# Patient Record
(demographics unavailable — no encounter records)

---

## 2017-03-24 NOTE — US
HISTORY:

pain, DUB



COMPARISON:

Transvaginal pelvic ultrasound performed 9/24/15



TECHNIQUE:

Transabdominal pelvic ultrasound



FINDINGS:



UTERUS:

Measures 10.8 x 3.1 x 5.3 cm. Anteverted.



ENDOMETRIUM:

Measures 4 mm in diameter.  



CERVIX:

No cervical abnormality identified.



RIGHT OVARY:

Measures 3.4 x 1.7 x 2.5 cm. Blood flow is demonstrated.



LEFT OVARY:

Measures 2.5 x 1.7 x 3.5 cm. Blood flow is demonstrated.



FREE FLUID:

No significant free fluid noted.



OTHER FINDINGS:

None. 



IMPRESSION:

Unremarkable pelvic ultrasound.

## 2017-03-24 NOTE — ED PDOC
HPI: Female  Pain


Time Seen by Provider: 03/24/17 13:24


Chief Complaint (Nursing): Female Genitourinary


Chief Complaint (Provider): Female Genitourinary


History Per: Patient


History/Exam Limitations: no limitations


Onset/Duration Of Symptoms: Days


Current Symptoms Are (Timing): Still Present


Severity: Mild


Quality Of Discomfort: Cramping


Associated Symptoms: denies: Fever, Nausea, Vomiting


Alleviating Factors: None


Additional Complaint(s): 


Patient is a 21 year old female with a history of anemia secondary to vaginal 

bleeding resulting in transfusion, presents to ED for 1 month of vaginal 

bleeding with suprapubic cramping. Patient recently had her birth control 

changed 1-2 months ago with no relief, taking Norethradone. Patient notes 

several pads daily. 





Past Medical History


Reviewed: Historical Data, Nursing Documentation, Vital Signs


Vital Signs: 





 Last Vital Signs











Temp  98.1 F   03/24/17 13:03


 


Pulse  78   03/24/17 13:03


 


Resp  18   03/24/17 13:03


 


BP  118/72   03/24/17 13:03


 


Pulse Ox  100   03/24/17 13:03














- Medical History


PMH: Kidney Stones


   Denies: HIV





- Surgical History


Surgical History: No Surg Hx





- Family History


Family History: States: Unknown Family Hx





- Living Arrangements


Living Arrangements: With Family





- Immunization History


Hx Tetanus Toxoid Vaccination: No


Hx Influenza Vaccination: Yes


Hx Pneumococcal Vaccination: No





- Home Medications


Home Medications: 


 Ambulatory Orders











 Medication  Instructions  Recorded


 


Ascorbic Acid [Vitamin C] 500 mg PO DAILY #30 tab 09/25/15


 


Ferrous Sulfate 325 mg PO DAILY #30 tab 09/25/15


 


Norgestimate-Ethinyl Estradiol 1 each PO DAILY #28 tablet 09/19/16





[Sprintec 28 Day Tablet]  


 


Ibuprofen [Motrin Tab] 800 mg PO Q6H PRN #20 tab 01/30/17


 


Ferrous Sulfate 325 mg PO DAILY #30 tablet 03/24/17


 


Ibuprofen [Motrin] 600 mg PO TID PRN #30 tab 03/24/17














- Allergies


Allergies/Adverse Reactions: 


 Allergies











Allergy/AdvReac Type Severity Reaction Status Date / Time


 


No Known Allergies Allergy   Verified 03/24/17 13:01














Review of Systems


ROS Statement: Except As Marked, All Systems Reviewed And Found Negative


Constitutional: Negative for: Fever, Weakness


Eyes: Negative for: Vision Change


Cardiovascular: Negative for: Chest Pain, Palpitations


Respiratory: Negative for: Shortness of Breath


Gastrointestinal: Positive for: Abdominal Pain.  Negative for: Nausea, Vomiting


Genitourinary Female: Positive for: Vaginal Bleeding.  Negative for: Dysuria





Physical Exam





- Reviewed


Nursing Documentation Reviewed: Yes


Vital Signs Reviewed: Yes





- Physical Exam


Appears: Positive for: Non-toxic, No Acute Distress


Skin: Positive for: Normal Color, Warm.  Negative for: Pallor


Eye Exam: Positive for: Normal appearance


Neck: Positive for: Normal, Painless ROM


Cardiovascular/Chest: Positive for: Regular Rate, Rhythm.  Negative for: Murmur

, Tachycardia


Respiratory: Positive for: Normal Breath Sounds.  Negative for: Respiratory 

Distress


Gastrointestinal/Abdominal: Positive for: Tenderness (mild suprapubic ).  

Negative for: Distended, Guarding, Rebound


Pelvic Exam: Positive for: External Exam Normal, Bimanual Exam Normal, No Cerv. 

Motion Tender, Active Bleeding (scant, closed os)


Back: Positive for: Normal Inspection


Extremity: Positive for: Normal ROM, Capillary Refill (normal )


Neurologic/Psych: Positive for: Alert, Oriented, Gait (normal ).  Negative for: 

Motor/Sensory Deficits





- Laboratory Results


Result Diagrams: 


 03/24/17 14:45





 03/24/17 14:45


Urine Pregnancy POC: Negative


Urine dip results: Positive for: Blood.  Negative for: Leukocyte Esterase, 

Nitrate, Ketones, Glucose, Bilirubin, Protein





- ECG


O2 Sat by Pulse Oximetry: 100 (RA)


Pulse Ox Interpretation: Normal





Medical Decision Making


Medical Decision Making: 


Time: 1345





Initial impression: Vaginal bleeding: dysmenorrhea vs DUB r/o Anemia and 

endometrial thickening 





Initial plan:


-- Type and screen


-- CMP


-- Lipase


-- Urine preg


-- Urine dip


-- CBC


-- Toradol


-- U/S








Scribe Attestation:


Documented by Doris Robbins acting as a scribe for Brody Haynes PA-C. MD Scribe Attestation:


All medical record entries made by the Scribe were at my direction and 

personally dictated by me. I have reviewed the chart and agree that the record 

accurately reflects my personal performance of the history, physical exam, 

medical decision making, and the department course for this patient. I have 

also personally directed, reviewed, and agree with the discharge instructions 

and disposition.





Labs with no acute finding. 


9.8 Hgb


US pending





US-FINDINGS:


UTERUS:Measures 10.8 x 3.1 x 5.3 cm. Anteverted.


ENDOMETRIUM:Measures 4 mm in diameter.  


CERVIX:No cervical abnormality identified.


RIGHT OVARY:Measures 3.4 x 1.7 x 2.5 cm. Blood flow is demonstrated.


LEFT OVARY:Measures 2.5 x 1.7 x 3.5 cm. Blood flow is demonstrated.


FREE FLUID:No significant free fluid noted.


OTHER FINDINGS:None. 


IMPRESSION: Unremarkable pelvic ultrasound.








discussed with patient results, follow up discharge information with video 

 anais ChanoNely


included labs 9.8 hgb, us and urine test. it included follow up and may need 

for change in hormone therapy


all questions answered, stable for 





Disposition





- Clinical Impression


Clinical Impression: 


 DUB (dysfunctional uterine bleeding), Anemia





- Patient ED Disposition


Is Patient to be Admitted: No


Counseled Patient/Family Regarding: Studies Performed, Diagnosis, Need For 

Followup





- Disposition


Disposition: Routine/Home


Disposition Time: 17:16


Condition: IMPROVED


Additional Instructions: 


follow up with GYN without fail for further evaluation and treatment


your blood level is 9.8


take iron pills 


return for  severe bleeding greater then 1 pad per hour, severe abdominal pain, 

dizziness, headaches, chest pain, trouble breathing, palpitations or any new 

concerns





Prescriptions: 


Ferrous Sulfate 325 mg PO DAILY #30 tablet


Ibuprofen [Motrin] 600 mg PO TID PRN #30 tab


 PRN Reason: Other


Instructions:  Dysfunctional Uterine Bleeding (ED), Anemia (ED)


Print Language: Occitan

## 2018-03-04 NOTE — ED PDOC
HPI: Female  Pain


Time Seen by Provider: 03/04/18 13:18


Chief Complaint (Nursing): Female Genitourinary


Chief Complaint (Provider): VAGINAL DC


History Per: Patient


History/Exam Limitations: no limitations


Onset/Duration Of Symptoms: Days


Additional Complaint(s): 


Pt. with vaginal dc, white color for 3 days.  Pt. with no pelvic, pain, dyspnea

, chest pain, weakness, headaches.  No dysuria.  Has had similar dc momths ago 

and given some meds that helped it.  No fever. 





Past Medical History


Reviewed: Nursing Documentation, Vital Signs


Vital Signs: 





 Last Vital Signs











Temp  98.4 F   03/04/18 12:49


 


Pulse  78   03/04/18 12:49


 


Resp  19   03/04/18 12:49


 


BP  125/74   03/04/18 12:49


 


Pulse Ox  100   03/04/18 12:49














- Medical History


PMH: Kidney Stones


   Denies: HIV


Other PMH: vaginal dc infection





- Surgical History


Surgical History: No Surg Hx





- Family History


Family History: States: Unknown Family Hx





- Living Arrangements


Living Arrangements: With Family





- Social History


Current smoker - smoking cessation education provided: No


Alcohol: None


Drugs: Denies





- Immunization History


Hx Tetanus Toxoid Vaccination: No


Hx Influenza Vaccination: Yes


Hx Pneumococcal Vaccination: No





- Home Medications


Home Medications: 


 Ambulatory Orders











 Medication  Instructions  Recorded


 


Ascorbic Acid [Vitamin C] 500 mg PO DAILY #30 tab 09/25/15


 


Ferrous Sulfate 325 mg PO DAILY #30 tab 09/25/15


 


Norgestimate-Ethinyl Estradiol 1 each PO DAILY #28 tablet 09/19/16





[Sprintec 28 Day Tablet]  


 


Ibuprofen [Motrin Tab] 800 mg PO Q6H PRN #20 tab 01/30/17


 


Ferrous Sulfate 325 mg PO DAILY #30 tablet 03/24/17


 


Ibuprofen [Motrin] 600 mg PO TID PRN #30 tab 03/24/17


 


Ciprofloxacin HCl [Cipro] 250 mg PO BID #6 tab 03/04/18


 


Doxycycline Hyclate [Doryx] 100 mg PO BID 14 Days  cap 03/04/18


 


Ibuprofen [Motrin] 600 mg PO TID 7 Days  tab 03/04/18


 


Nystatin [Mycostatin Cream] 1 appl TP BID 5 Days  tube 03/04/18














- Allergies


Allergies/Adverse Reactions: 


 Allergies











Allergy/AdvReac Type Severity Reaction Status Date / Time


 


No Known Allergies Allergy   Verified 03/24/17 13:01














Review of Systems


Constitutional: Negative for: Chills, Weakness


Cardiovascular: Negative for: Chest Pain, Edema


Respiratory: Negative for: Shortness of Breath


Gastrointestinal: Negative for: Nausea, Vomiting, Abdominal Pain, Diarrhea


Genitourinary Female: Positive for: Vaginal Discharge


Musculoskeletal: Negative for: Neck Pain, Shoulder Pain, Arm Pain


Skin: Negative for: Rash


Neurological: Negative for: Weakness





Physical Exam





- Reviewed


Nursing Documentation Reviewed: Yes


Vital Signs Reviewed: Yes





- Physical Exam


Appears: Positive for: Non-toxic, No Acute Distress


Cardiovascular/Chest: Positive for: Regular Rate, Rhythm


Respiratory: Positive for: Normal Breath Sounds


Gastrointestinal/Abdominal: Positive for: Normal Exam, Bowel Sounds, Soft.  

Negative for: Tenderness


Pelvic Exam: Positive for: External Exam Normal, No Cerv. Motion Tender, Other (

cervix with white-yellow dc; no odor; no erythema of cervix).  Negative for: 

Speculum Exam Normal, Active Bleeding


Back: Positive for: Normal Inspection.  Negative for: L CVA Tenderness, R CVA 

Tenderness


Extremity: Positive for: Normal ROM.  Negative for: Tenderness, Pedal Edema


Neurologic/Psych: Positive for: Alert





- Laboratory Results


Urine dip results: Positive for: Leukocyte Esterase





- ECG


O2 Sat by Pulse Oximetry: 100


Pulse Ox Interpretation: Normal





- Progress


ED Course And Treament: 





1413:  Stable.  AAOx3.  Pt. with vaginal dc.  Will tx for uti, std possible and 

fungal.  Pt. states vaginal area itching.  





Disposition





- Clinical Impression


Clinical Impression: 


 Urinary tract infection, Vaginal discharge








- Patient ED Disposition


Is Patient to be Admitted: No


Counseled Patient/Family Regarding: Diagnosis, Need For Followup, Rx Given





- Disposition


Referrals: 


[facility] - 03/06/18


Disposition: Routine/Home


Disposition Time: 14:14


Condition: STABLE


Additional Instructions: 


Return if not better in 3 days. 


Prescriptions: 


Ciprofloxacin HCl [Cipro] 250 mg PO BID #6 tab


Doxycycline Hyclate [Doryx] 100 mg PO BID 14 Days  cap


Ibuprofen [Motrin] 600 mg PO TID 7 Days  tab


Nystatin [Mycostatin Cream] 1 appl TP BID 5 Days  tube


Instructions:  Urinary Tract Infection, Adult (DC), Vaginal Discharge in Adults


Print Language: Kittitian

## 2018-03-28 NOTE — ED PDOC
HPI: General Adult


Time Seen by Provider: 03/28/18 05:10


Chief Complaint (Nursing): ENT Problem


Chief Complaint (Provider): right ear pain


History Per: Patient


History/Exam Limitations: no limitations


Onset/Duration Of Symptoms: Mins


Current Symptoms Are (Timing): Still Present


Additional Complaint(s): 





21 y/o female presents for evaluation of right ear pain, onset prior to 

arrival.  Patient states she was sleeping and felt something go in to her ear 

and now feels it moving around.  Denies hearing changes, drainage from ear. 





Past Medical History


Reviewed: Historical Data, Nursing Documentation, Vital Signs


Vital Signs: 





 Last Vital Signs











Temp  98.0 F   03/28/18 05:08


 


Pulse  84   03/28/18 05:08


 


Resp  16   03/28/18 05:08


 


BP  124/76   03/28/18 05:08


 


Pulse Ox  100   03/28/18 05:08














- Medical History


PMH: Kidney Stones


   Denies: HIV





- Surgical History


Surgical History: No Surg Hx





- Family History


Family History: States: Unknown Family Hx





- Living Arrangements


Living Arrangements: With Family





- Immunization History


Hx Tetanus Toxoid Vaccination: No


Hx Influenza Vaccination: Yes


Hx Pneumococcal Vaccination: No





- Home Medications


Home Medications: 


 Ambulatory Orders











 Medication  Instructions  Recorded


 


Ascorbic Acid [Vitamin C] 500 mg PO DAILY #30 tab 09/25/15


 


Ferrous Sulfate 325 mg PO DAILY #30 tab 09/25/15


 


Norgestimate-Ethinyl Estradiol 1 each PO DAILY #28 tablet 09/19/16





[Sprintec 28 Day Tablet]  


 


Ibuprofen [Motrin Tab] 800 mg PO Q6H PRN #20 tab 01/30/17


 


Ferrous Sulfate 325 mg PO DAILY #30 tablet 03/24/17


 


Ibuprofen [Motrin] 600 mg PO TID PRN #30 tab 03/24/17


 


Ciprofloxacin HCl [Cipro] 250 mg PO BID #6 tab 03/04/18


 


Doxycycline Hyclate [Doryx] 100 mg PO BID 14 Days  cap 03/04/18


 


Ibuprofen [Motrin] 600 mg PO TID 7 Days  tab 03/04/18


 


Nystatin [Mycostatin Cream] 1 appl TP BID 5 Days  tube 03/04/18














- Allergies


Allergies/Adverse Reactions: 


 Allergies











Allergy/AdvReac Type Severity Reaction Status Date / Time


 


No Known Allergies Allergy   Verified 03/24/17 13:01














Review of Systems


ROS Statement: Except As Marked, All Systems Reviewed And Found Negative


ENT: Positive for: Ear Pain (right)





Physical Exam





- Reviewed


Nursing Documentation Reviewed: Yes


Vital Signs Reviewed: Yes





- Physical Exam


Appears: Positive for: Well, Non-toxic, No Acute Distress


ENT: Positive for: TM Is/Are (black insect moving around in right EAC.  TM not 

visualized.  Left TM, EAC clear).  Negative for: Pharyngeal Erythema, Tonsillar 

Exudate, Tonsillar Swelling





- ECG


O2 Sat by Pulse Oximetry: 100





- Progress


ED Course And Treament: 





Viscous lidocaine placed in to right ear.


Right ear irrigated with normal saline with + removal of insect.


RIght TM clear.  Right EAC clear.





Patient educated on findings, discharged with instructions to follow up PMD 2-3 

days.


Return precautions given





Disposition





- Clinical Impression


Clinical Impression: 


 Ear foreign body








- Patient ED Disposition


Is Patient to be Admitted: No


Counseled Patient/Family Regarding: Diagnosis, Need For Followup





- Disposition


Referrals: 


[facility] [Outside]


Disposition: Routine/Home


Disposition Time: 05:34


Condition: IMPROVED


Instructions:  Removing Objects Stuck in the Ear


Print Language: Bulgarian

## 2018-05-31 NOTE — ED PDOC
HPI: Female  Pain


Time Seen by Provider: 05/31/18 19:23


Chief Complaint (Nursing): Female Genitourinary


Chief Complaint (Provider): Suprapubic pain, dysuria x 3 days 


History Per: Patient


History/Exam Limitations: no limitations


Onset/Duration Of Symptoms: Days


Current Symptoms Are (Timing): Still Present


Associated Symptoms: Urinary Symptoms.  denies: Fever, Chills, Nausea, Vomiting

, Loss Of Appetite, Back Pain, Constipation


Alleviating Factors: None


Additional Complaint(s): 


3 days of burning on urination and suprapubic pain. 





No fever/chills. No back pain. No N/V/D. 








Past Medical History


Vital Signs: 





 Last Vital Signs











Temp  99 F   05/31/18 19:15


 


Pulse  88   05/31/18 19:15


 


Resp  18   05/31/18 19:15


 


BP  128/74   05/31/18 19:15


 


Pulse Ox  98   05/31/18 19:15














- Medical History


PMH: Kidney Stones


   Denies: HIV





- Family History


Family History: States: Unknown Family Hx





- Immunization History


Hx Tetanus Toxoid Vaccination: No


Hx Influenza Vaccination: Yes


Hx Pneumococcal Vaccination: No





- Home Medications


Home Medications: 


 Ambulatory Orders











 Medication  Instructions  Recorded


 


Ascorbic Acid [Vitamin C] 500 mg PO DAILY #30 tab 09/25/15


 


Ferrous Sulfate 325 mg PO DAILY #30 tab 09/25/15


 


Norgestimate-Ethinyl Estradiol 1 each PO DAILY #28 tablet 09/19/16





[Sprintec 28 Day Tablet]  


 


Ibuprofen [Motrin Tab] 800 mg PO Q6H PRN #20 tab 01/30/17


 


Ferrous Sulfate 325 mg PO DAILY #30 tablet 03/24/17


 


Ibuprofen [Motrin] 600 mg PO TID PRN #30 tab 03/24/17


 


Ciprofloxacin HCl [Cipro] 250 mg PO BID #6 tab 03/04/18


 


Doxycycline Hyclate [Doryx] 100 mg PO BID 14 Days  cap 03/04/18


 


Ibuprofen [Motrin] 600 mg PO TID 7 Days  tab 03/04/18


 


Nystatin [Mycostatin Cream] 1 appl TP BID 5 Days  tube 03/04/18














- Allergies


Allergies/Adverse Reactions: 


 Allergies











Allergy/AdvReac Type Severity Reaction Status Date / Time


 


No Known Allergies Allergy   Verified 05/31/18 19:15














Physical Exam





- Reviewed


Nursing Documentation Reviewed: Yes


Vital Signs Reviewed: Yes





- Physical Exam


Appears: Positive for: Well, Non-toxic, No Acute Distress


Head Exam: Positive for: ATRAUMATIC, NORMAL INSPECTION, NORMOCEPHALIC


Skin: Positive for: Normal Color, Warm, DRY


Eye Exam: Positive for: Normal appearance


ENT: Positive for: Normal ENT Inspection


Neck: Positive for: Normal, Painless ROM


Cardiovascular/Chest: Positive for: Regular Rate, Rhythm


Respiratory: Positive for: Normal Breath Sounds.  Negative for: Accessory 

Muscle Use, Respiratory Distress


Gastrointestinal/Abdominal: Positive for: Normal Exam


Back: Positive for: Normal Inspection


Extremity: Positive for: Normal ROM


Neurologic/Psych: Positive for: Alert, Oriented





- ECG


O2 Sat by Pulse Oximetry: 98


Pulse Ox Interpretation: Normal





Medical Decision Making


Medical Decision Making: 





Leuks and blood on US. 





Disposition





- Clinical Impression


Clinical Impression: 


 Urinary tract infection








- Patient ED Disposition


Is Patient to be Admitted: No


Counseled Patient/Family Regarding: Diagnosis, Need For Followup, Rx Given





- Disposition


Disposition: Routine/Home


Disposition Time: 19:55


Condition: STABLE


Instructions:  Urinary Tract Infections in Adults


Print Language: Jordanian